# Patient Record
Sex: MALE
[De-identification: names, ages, dates, MRNs, and addresses within clinical notes are randomized per-mention and may not be internally consistent; named-entity substitution may affect disease eponyms.]

---

## 2022-11-21 ENCOUNTER — HOSPITAL ENCOUNTER (OUTPATIENT)
Dept: HOSPITAL 76 - DI.S | Age: 77
Discharge: HOME | End: 2022-11-21
Attending: EMERGENCY MEDICINE
Payer: MEDICARE

## 2022-11-21 DIAGNOSIS — M19.041: Primary | ICD-10-CM

## 2022-11-22 NOTE — XRAY REPORT
PROCEDURE:  Finger(s) RT

 

INDICATIONS:  DISPLACED FRACTURE OF RIGHT MIDDLE FINGER. Laceration to third and fourth digits.

 

TECHNIQUE:  AP hand, 2 views of the third digit acquired.  

 

COMPARISON:  None

 

FINDINGS:  

 

Bones: Pronounced polyarticular degenerative changes present with joint space narrowing and significa
nt spurring most pronounced at the IP joints and MCP joints. Degree of spurring makes evaluation for 
acute fracture difficult. Possible fracture of a dorsal osteophyte at the fourth digit DIP joint. Per
iarticular lucencies also present indeterminate for erosions or subchondral cystic change.

 

Soft tissues:  No suspicious soft tissue calcifications.  Soft tissue defect present at the fourth di
git about the middle phalanx and DIP joint.

 

IMPRESSION:  

Pronounced polyarticular degenerative changes are present, which makes evaluation difficult. There is
 a possible mildly displaced acute fracture of a dorsal osteophyte at the fourth digit DIP joint. If 
symptoms persist, follow-up radiographs and/or CT or MRI may be helpful for further evaluation.

 

Reviewed by: Maco Watts MD on 11/22/2022 10:45 AM PST

Approved by: Maco Watts MD on 11/22/2022 10:45 AM PST

 

 

Station ID:  [id number]